# Patient Record
Sex: MALE | Race: WHITE | NOT HISPANIC OR LATINO | Employment: FULL TIME | URBAN - NONMETROPOLITAN AREA
[De-identification: names, ages, dates, MRNs, and addresses within clinical notes are randomized per-mention and may not be internally consistent; named-entity substitution may affect disease eponyms.]

---

## 2023-11-08 ENCOUNTER — OFFICE VISIT (OUTPATIENT)
Dept: PRIMARY CARE | Facility: CLINIC | Age: 38
End: 2023-11-08
Payer: COMMERCIAL

## 2023-11-08 VITALS
WEIGHT: 252.25 LBS | HEIGHT: 69 IN | SYSTOLIC BLOOD PRESSURE: 144 MMHG | HEART RATE: 66 BPM | BODY MASS INDEX: 37.36 KG/M2 | DIASTOLIC BLOOD PRESSURE: 90 MMHG

## 2023-11-08 DIAGNOSIS — R73.03 PREDIABETES: ICD-10-CM

## 2023-11-08 DIAGNOSIS — E66.01 CLASS 2 SEVERE OBESITY DUE TO EXCESS CALORIES WITH SERIOUS COMORBIDITY AND BODY MASS INDEX (BMI) OF 37.0 TO 37.9 IN ADULT (MULTI): ICD-10-CM

## 2023-11-08 DIAGNOSIS — Z09 HOSPITAL DISCHARGE FOLLOW-UP: Primary | ICD-10-CM

## 2023-11-08 DIAGNOSIS — E78.5 DYSLIPIDEMIA: ICD-10-CM

## 2023-11-08 DIAGNOSIS — I10 PRIMARY HYPERTENSION: ICD-10-CM

## 2023-11-08 PROBLEM — R07.9 CHEST PAIN: Status: ACTIVE | Noted: 2023-11-02

## 2023-11-08 PROCEDURE — 1036F TOBACCO NON-USER: CPT | Performed by: NURSE PRACTITIONER

## 2023-11-08 PROCEDURE — 3077F SYST BP >= 140 MM HG: CPT | Performed by: NURSE PRACTITIONER

## 2023-11-08 PROCEDURE — 99204 OFFICE O/P NEW MOD 45 MIN: CPT | Performed by: NURSE PRACTITIONER

## 2023-11-08 PROCEDURE — 3080F DIAST BP >= 90 MM HG: CPT | Performed by: NURSE PRACTITIONER

## 2023-11-08 PROCEDURE — 3008F BODY MASS INDEX DOCD: CPT | Performed by: NURSE PRACTITIONER

## 2023-11-08 RX ORDER — HYDROCHLOROTHIAZIDE 12.5 MG/1
12.5 TABLET ORAL DAILY
Qty: 30 TABLET | Refills: 0 | Status: SHIPPED | OUTPATIENT
Start: 2023-11-08 | End: 2023-11-29 | Stop reason: SDUPTHER

## 2023-11-08 NOTE — PROGRESS NOTES
Est Care with PCP   was seeing Dr. Bray   Last seen 2018  Follow up from ER Chest Pain     [facility]

## 2023-11-08 NOTE — PROGRESS NOTES
Subjective   Patient ID: Dagoberto Spangler is a 38 y.o. male who presents for Establish Care.    HPI   NPV     Baljinder here to establish care.  He was seen at [facility] ER on 11/2/23 for chest pain.     Chest pain: He reports that he started getting pain in his chest and felt like his chest was beating fast. He ended up going to the ER. They felt it may have been anxiety or GERD related as his cardiac testing was negative. He is concerned as he currently lives with his parents and takes care of his father who wanders in the evenings and has frequent falls d/t alcoholism. He reports he will startle awake sometimes but again does not know if this is related to issues from being hyperaware of listening for his father at night or something with his own health.    He did have a CTA in the ER. 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     His A1c was also found to be elevated in the ER which puts him in the prediabetic range. He reports prior to this event, he had been drinking Sprite and chocolate milk. He has stopped drinking these and is now drinking water. His lipid panel was also elevated. We discussed the causes for this d/t his diet.       Tobacco: Denies  ETOH: Denies  Recreational drugs: Denies      Review of Systems   Constitutional:  Negative for fatigue.   Respiratory:  Positive for chest tightness. Negative for shortness of breath.    Cardiovascular:  Negative for chest pain, palpitations and leg swelling.   Gastrointestinal:  Negative for abdominal pain, blood in stool, constipation, diarrhea, nausea and vomiting.   Genitourinary:  Negative for dysuria.   Musculoskeletal:  Negative for arthralgias and myalgias.   Skin:  Negative for color change.   Neurological:  Negative for dizziness, light-headedness and headaches.   Psychiatric/Behavioral:  Positive for sleep disturbance (d/t care of father). Negative for  "suicidal ideas. The patient is nervous/anxious.        Objective   /90   Pulse 66   Ht 1.753 m (5' 9\")   Wt 114 kg (252 lb 4 oz)   BMI 37.25 kg/m²     Physical Exam  Vitals and nursing note reviewed.   Constitutional:       Appearance: Normal appearance.   HENT:      Head: Normocephalic and atraumatic.   Cardiovascular:      Rate and Rhythm: Normal rate and regular rhythm.      Pulses: Normal pulses.      Heart sounds: Normal heart sounds.   Pulmonary:      Effort: Pulmonary effort is normal.      Breath sounds: Normal breath sounds.   Abdominal:      General: Bowel sounds are normal.      Palpations: Abdomen is soft.   Musculoskeletal:         General: Normal range of motion.   Skin:     General: Skin is warm and dry.   Neurological:      General: No focal deficit present.      Mental Status: He is alert and oriented to person, place, and time.   Psychiatric:         Mood and Affect: Mood normal.         Behavior: Behavior normal.         Thought Content: Thought content normal.         Judgment: Judgment normal.         Assessment/Plan   Problem List Items Addressed This Visit             ICD-10-CM    Primary hypertension I10     Will start hydrochlorothiazide 12.5 mg daily         Relevant Medications    hydroCHLOROthiazide (HYDRODiuril) 12.5 mg tablet    Other Relevant Orders    Basic metabolic panel    Prediabetes R73.03     11/2/23: A1c 6.1%         Relevant Orders    Hemoglobin A1C    Dyslipidemia E78.5     Discussed dietary changes. We will repeat labs in February. If still elevated, we will discuss medication.          Relevant Orders    Lipid panel    Class 2 severe obesity due to excess calories with serious comorbidity and body mass index (BMI) of 37.0 to 37.9 in adult (CMS/[facility]) E66.01, Z68.37     Other Visit Diagnoses         Codes    Hospital discharge follow-up    -  Primary Z09             Follow up in 2-3 weeks after starting hydrochlorothiazide. We will also repeat his labs in February. "

## 2023-11-10 PROBLEM — E66.01 CLASS 2 SEVERE OBESITY DUE TO EXCESS CALORIES WITH SERIOUS COMORBIDITY AND BODY MASS INDEX (BMI) OF 37.0 TO 37.9 IN ADULT (MULTI): Status: ACTIVE | Noted: 2023-11-10

## 2023-11-10 ASSESSMENT — ENCOUNTER SYMPTOMS
NAUSEA: 0
DYSURIA: 0
SHORTNESS OF BREATH: 0
SLEEP DISTURBANCE: 1
DIARRHEA: 0
CONSTIPATION: 0
LIGHT-HEADEDNESS: 0
DIZZINESS: 0
VOMITING: 0
PALPITATIONS: 0
CHEST TIGHTNESS: 1
ABDOMINAL PAIN: 0
COLOR CHANGE: 0
MYALGIAS: 0
ARTHRALGIAS: 0
NERVOUS/ANXIOUS: 1
HEADACHES: 0
BLOOD IN STOOL: 0
FATIGUE: 0

## 2023-11-11 NOTE — PATIENT INSTRUCTIONS
BMI was above normal measurement. Current weight:    Weight change since last visit (-) denotes wt loss     Weight loss needed to achieve BMI 25:   Lbs  Weight loss needed to achieve BMI 30:   Lbs  Provided instructions on dietary changes  Provided instructions on exercise  Advised to Increase physical activity

## 2023-11-11 NOTE — ASSESSMENT & PLAN NOTE
Discussed dietary changes. We will repeat labs in February. If still elevated, we will discuss medication.

## 2023-11-29 ENCOUNTER — OFFICE VISIT (OUTPATIENT)
Dept: PRIMARY CARE | Facility: CLINIC | Age: 38
End: 2023-11-29
Payer: COMMERCIAL

## 2023-11-29 VITALS
DIASTOLIC BLOOD PRESSURE: 80 MMHG | BODY MASS INDEX: 36.94 KG/M2 | WEIGHT: 249.44 LBS | HEIGHT: 69 IN | SYSTOLIC BLOOD PRESSURE: 136 MMHG | HEART RATE: 80 BPM

## 2023-11-29 DIAGNOSIS — R05.1 ACUTE COUGH: ICD-10-CM

## 2023-11-29 DIAGNOSIS — I10 PRIMARY HYPERTENSION: Primary | ICD-10-CM

## 2023-11-29 DIAGNOSIS — R06.2 WHEEZING: ICD-10-CM

## 2023-11-29 PROCEDURE — 99214 OFFICE O/P EST MOD 30 MIN: CPT | Performed by: NURSE PRACTITIONER

## 2023-11-29 PROCEDURE — 3079F DIAST BP 80-89 MM HG: CPT | Performed by: NURSE PRACTITIONER

## 2023-11-29 PROCEDURE — 1036F TOBACCO NON-USER: CPT | Performed by: NURSE PRACTITIONER

## 2023-11-29 PROCEDURE — 3008F BODY MASS INDEX DOCD: CPT | Performed by: NURSE PRACTITIONER

## 2023-11-29 PROCEDURE — 3075F SYST BP GE 130 - 139MM HG: CPT | Performed by: NURSE PRACTITIONER

## 2023-11-29 RX ORDER — BENZONATATE 100 MG/1
100 CAPSULE ORAL 3 TIMES DAILY PRN
Qty: 42 CAPSULE | Refills: 0 | Status: SHIPPED | OUTPATIENT
Start: 2023-11-29 | End: 2023-12-29

## 2023-11-29 RX ORDER — HYDROCHLOROTHIAZIDE 12.5 MG/1
12.5 TABLET ORAL DAILY
Qty: 90 TABLET | Refills: 1 | Status: SHIPPED | OUTPATIENT
Start: 2023-11-29 | End: 2024-02-28 | Stop reason: SDUPTHER

## 2023-11-29 RX ORDER — PREDNISONE 20 MG/1
40 TABLET ORAL DAILY
Qty: 10 TABLET | Refills: 0 | Status: SHIPPED | OUTPATIENT
Start: 2023-11-29 | End: 2024-02-28 | Stop reason: ALTCHOICE

## 2023-11-29 ASSESSMENT — ENCOUNTER SYMPTOMS
MUSCULOSKELETAL NEGATIVE: 1
WHEEZING: 0
SHORTNESS OF BREATH: 1
NEUROLOGICAL NEGATIVE: 1
GASTROINTESTINAL NEGATIVE: 1
PSYCHIATRIC NEGATIVE: 1
COUGH: 1
EYES NEGATIVE: 1
CARDIOVASCULAR NEGATIVE: 1
CHEST TIGHTNESS: 0
FEVER: 0
FATIGUE: 0

## 2023-11-29 NOTE — PROGRESS NOTES
"Subjective   Patient ID: Dagoberto Spangler is a 38 y.o. male who presents for Follow-up.    HPI   Baljinder returns for BP follow up.    HTN:  BP is better. He denies chest pain/tightness, palpitations. He reports he is feeling better, and has cut out pop and cut back on sweets.     He reports he developed a cough. He reports that the cough started shortly after starting medication. However he is on diuretic. He reports that the cough is dry with occasional clear sputum. He does get some shortness of breath with increased coughing. He has not taken anything OTC for symptoms.       Review of Systems   Constitutional:  Negative for fatigue and fever.   HENT:  Positive for postnasal drip. Negative for congestion.    Eyes: Negative.    Respiratory:  Positive for cough and shortness of breath. Negative for chest tightness and wheezing.    Cardiovascular: Negative.    Gastrointestinal: Negative.    Genitourinary: Negative.    Musculoskeletal: Negative.    Neurological: Negative.    Psychiatric/Behavioral: Negative.         Objective   /80   Pulse 80   Ht 1.753 m (5' 9\")   Wt 113 kg (249 lb 7 oz)   BMI 36.84 kg/m²     Physical Exam  Vitals and nursing note reviewed.   Constitutional:       Appearance: Normal appearance.   HENT:      Head: Normocephalic and atraumatic.      Nose: Congestion present.   Cardiovascular:      Rate and Rhythm: Normal rate and regular rhythm.      Pulses: Normal pulses.      Heart sounds: Normal heart sounds.   Pulmonary:      Effort: Pulmonary effort is normal. No respiratory distress.      Breath sounds: No stridor. Wheezing present. No rhonchi or rales.   Neurological:      General: No focal deficit present.      Mental Status: He is alert and oriented to person, place, and time.   Psychiatric:         Mood and Affect: Mood normal.         Behavior: Behavior normal.         Thought Content: Thought content normal.         Judgment: Judgment normal.         Assessment/Plan   Problem List " Items Addressed This Visit             ICD-10-CM    Primary hypertension - Primary I10     Continue hydrochlorothiazide 12.5 mg daily - refilled         Relevant Medications    hydroCHLOROthiazide (HYDRODiuril) 12.5 mg tablet     Other Visit Diagnoses         Codes    Acute cough     R05.1    Relevant Medications    benzonatate (Tessalon) 100 mg capsule    predniSONE (Deltasone) 20 mg tablet    Wheezing     R06.2    Relevant Medications    predniSONE (Deltasone) 20 mg tablet              Follow up in Feb, lab work prior to appointment. He will let us know if his cough turns more productive or symptoms worsen. We will start an ATB.

## 2024-02-21 ENCOUNTER — LAB (OUTPATIENT)
Dept: LAB | Facility: LAB | Age: 39
End: 2024-02-21
Payer: COMMERCIAL

## 2024-02-21 DIAGNOSIS — R73.03 PREDIABETES: ICD-10-CM

## 2024-02-21 DIAGNOSIS — E78.5 DYSLIPIDEMIA: ICD-10-CM

## 2024-02-21 DIAGNOSIS — I10 PRIMARY HYPERTENSION: ICD-10-CM

## 2024-02-21 DIAGNOSIS — E78.5 DYSLIPIDEMIA: Primary | ICD-10-CM

## 2024-02-21 LAB
ALBUMIN SERPL BCP-MCNC: 4.3 G/DL (ref 3.4–5)
ALP SERPL-CCNC: 64 U/L (ref 33–120)
ALT SERPL W P-5'-P-CCNC: 30 U/L (ref 10–52)
ANION GAP SERPL CALC-SCNC: 11 MMOL/L (ref 10–20)
AST SERPL W P-5'-P-CCNC: 21 U/L (ref 9–39)
BILIRUB DIRECT SERPL-MCNC: 0.1 MG/DL (ref 0–0.3)
BILIRUB SERPL-MCNC: 0.6 MG/DL (ref 0–1.2)
BUN SERPL-MCNC: 21 MG/DL (ref 6–23)
CALCIUM SERPL-MCNC: 9.4 MG/DL (ref 8.6–10.3)
CHLORIDE SERPL-SCNC: 102 MMOL/L (ref 98–107)
CHOLEST SERPL-MCNC: 242 MG/DL (ref 0–199)
CHOLESTEROL/HDL RATIO: 5.6
CO2 SERPL-SCNC: 28 MMOL/L (ref 21–32)
CREAT SERPL-MCNC: 0.99 MG/DL (ref 0.5–1.3)
EGFRCR SERPLBLD CKD-EPI 2021: >90 ML/MIN/1.73M*2
EST. AVERAGE GLUCOSE BLD GHB EST-MCNC: 126 MG/DL
GLUCOSE SERPL-MCNC: 118 MG/DL (ref 74–99)
HBA1C MFR BLD: 6 %
HDLC SERPL-MCNC: 43 MG/DL
LDLC SERPL CALC-MCNC: 169 MG/DL
NON HDL CHOLESTEROL: 199 MG/DL (ref 0–149)
POTASSIUM SERPL-SCNC: 4.2 MMOL/L (ref 3.5–5.3)
PROT SERPL-MCNC: 7.5 G/DL (ref 6.4–8.2)
SODIUM SERPL-SCNC: 137 MMOL/L (ref 136–145)
TRIGL SERPL-MCNC: 150 MG/DL (ref 0–149)
VLDL: 30 MG/DL (ref 0–40)

## 2024-02-21 PROCEDURE — 36415 COLL VENOUS BLD VENIPUNCTURE: CPT

## 2024-02-21 PROCEDURE — 80053 COMPREHEN METABOLIC PANEL: CPT

## 2024-02-21 PROCEDURE — 80061 LIPID PANEL: CPT

## 2024-02-21 PROCEDURE — 83036 HEMOGLOBIN GLYCOSYLATED A1C: CPT

## 2024-02-21 PROCEDURE — 82248 BILIRUBIN DIRECT: CPT

## 2024-02-28 ENCOUNTER — OFFICE VISIT (OUTPATIENT)
Dept: PRIMARY CARE | Facility: CLINIC | Age: 39
End: 2024-02-28
Payer: COMMERCIAL

## 2024-02-28 VITALS
WEIGHT: 253.38 LBS | DIASTOLIC BLOOD PRESSURE: 80 MMHG | BODY MASS INDEX: 37.53 KG/M2 | SYSTOLIC BLOOD PRESSURE: 124 MMHG | HEIGHT: 69 IN | HEART RATE: 96 BPM

## 2024-02-28 DIAGNOSIS — I10 PRIMARY HYPERTENSION: Primary | ICD-10-CM

## 2024-02-28 DIAGNOSIS — R73.03 PREDIABETES: ICD-10-CM

## 2024-02-28 DIAGNOSIS — E66.01 CLASS 2 SEVERE OBESITY DUE TO EXCESS CALORIES WITH SERIOUS COMORBIDITY AND BODY MASS INDEX (BMI) OF 37.0 TO 37.9 IN ADULT (MULTI): ICD-10-CM

## 2024-02-28 DIAGNOSIS — E78.5 DYSLIPIDEMIA: ICD-10-CM

## 2024-02-28 PROCEDURE — 1036F TOBACCO NON-USER: CPT | Performed by: NURSE PRACTITIONER

## 2024-02-28 PROCEDURE — 3074F SYST BP LT 130 MM HG: CPT | Performed by: NURSE PRACTITIONER

## 2024-02-28 PROCEDURE — 99214 OFFICE O/P EST MOD 30 MIN: CPT | Performed by: NURSE PRACTITIONER

## 2024-02-28 PROCEDURE — 3079F DIAST BP 80-89 MM HG: CPT | Performed by: NURSE PRACTITIONER

## 2024-02-28 PROCEDURE — 3008F BODY MASS INDEX DOCD: CPT | Performed by: NURSE PRACTITIONER

## 2024-02-28 RX ORDER — HYDROCHLOROTHIAZIDE 12.5 MG/1
12.5 TABLET ORAL DAILY
Qty: 90 TABLET | Refills: 1 | Status: SHIPPED | OUTPATIENT
Start: 2024-02-28 | End: 2024-05-31 | Stop reason: SDUPTHER

## 2024-02-28 ASSESSMENT — ENCOUNTER SYMPTOMS
VOMITING: 0
ABDOMINAL PAIN: 0
MYALGIAS: 0
CHEST TIGHTNESS: 0
COLOR CHANGE: 0
SHORTNESS OF BREATH: 0
HEMATOLOGIC/LYMPHATIC NEGATIVE: 1
NAUSEA: 0
LIGHT-HEADEDNESS: 0
HEADACHES: 0
BLOOD IN STOOL: 0
DIZZINESS: 0
CONSTIPATION: 0
PSYCHIATRIC NEGATIVE: 1
FATIGUE: 0
ARTHRALGIAS: 0
PALPITATIONS: 0
DIARRHEA: 0
DYSURIA: 0

## 2024-02-28 ASSESSMENT — PATIENT HEALTH QUESTIONNAIRE - PHQ9
2. FEELING DOWN, DEPRESSED OR HOPELESS: NOT AT ALL
1. LITTLE INTEREST OR PLEASURE IN DOING THINGS: NOT AT ALL
SUM OF ALL RESPONSES TO PHQ9 QUESTIONS 1 AND 2: 0

## 2024-02-28 NOTE — PROGRESS NOTES
"Subjective   Patient ID: Dagoberto Spangler is a 38 y.o. male who presents for Follow-up.    HPI   Baljinder returns for lab review, and BP follow up.    HTN: denies chest pain, dizziness or lower leg edema. He reports he is feeling better.    Prediabetes: has cut back on all \"pop\". Has an occasional one, with \"pizza\". Has stopped drinking chocolate milk. He is trying to eat healthier.     Hyperlipidemia: making small dietary adjustments at a time.       Review of Systems   Constitutional:  Negative for fatigue.   HENT: Negative.     Respiratory:  Negative for chest tightness and shortness of breath.    Cardiovascular:  Negative for chest pain, palpitations and leg swelling.   Gastrointestinal:  Negative for abdominal pain, blood in stool, constipation, diarrhea, nausea and vomiting.   Genitourinary:  Negative for dysuria.   Musculoskeletal:  Negative for arthralgias and myalgias.   Skin:  Negative for color change.   Neurological:  Negative for dizziness, light-headedness and headaches.   Hematological: Negative.    Psychiatric/Behavioral: Negative.         Objective   /80   Pulse 96   Ht 1.753 m (5' 9\")   Wt 115 kg (253 lb 6 oz)   BMI 37.42 kg/m²     Physical Exam  Vitals and nursing note reviewed.   Constitutional:       Appearance: Normal appearance.   Cardiovascular:      Rate and Rhythm: Normal rate and regular rhythm.      Pulses: Normal pulses.      Heart sounds: Normal heart sounds.   Pulmonary:      Effort: Pulmonary effort is normal.      Breath sounds: Normal breath sounds.   Skin:     General: Skin is warm and dry.   Neurological:      General: No focal deficit present.      Mental Status: He is alert and oriented to person, place, and time.   Psychiatric:         Mood and Affect: Mood normal.         Behavior: Behavior normal.         Thought Content: Thought content normal.         Judgment: Judgment normal.         Assessment/Plan   Problem List Items Addressed This Visit             ICD-10-CM    " Primary hypertension - Primary I10     Hydrochlorothiazide 12.5 mg daily         Relevant Medications    hydroCHLOROthiazide (Microzide) 12.5 mg tablet    Prediabetes R73.03     2/21/24: A1c 6.0%         Relevant Orders    Hemoglobin A1C    Dyslipidemia E78.5     2/21/24: chol 242, HDL 43, , Tri 150         Relevant Orders    Comprehensive Metabolic Panel    Lipid Panel    Class 2 severe obesity due to excess calories with serious comorbidity and body mass index (BMI) of 37.0 to 37.9 in adult (CMS/[facility]) E66.01, Z68.37     Pt advised to institute a healthy, well-balanced meal with portion control and to exercise daily for at least 30-60 minutes.              Follow up in 6 months for BP follow up. He will get labs before appt.

## 2024-05-31 DIAGNOSIS — I10 PRIMARY HYPERTENSION: ICD-10-CM

## 2024-05-31 RX ORDER — HYDROCHLOROTHIAZIDE 12.5 MG/1
12.5 TABLET ORAL DAILY
Qty: 90 TABLET | Refills: 0 | Status: SHIPPED | OUTPATIENT
Start: 2024-05-31

## 2024-08-27 ENCOUNTER — LAB (OUTPATIENT)
Dept: LAB | Facility: LAB | Age: 39
End: 2024-08-27
Payer: COMMERCIAL

## 2024-08-27 DIAGNOSIS — E78.5 DYSLIPIDEMIA: ICD-10-CM

## 2024-08-27 DIAGNOSIS — R73.03 PREDIABETES: ICD-10-CM

## 2024-08-27 LAB
ALBUMIN SERPL BCP-MCNC: 4.4 G/DL (ref 3.4–5)
ALP SERPL-CCNC: 59 U/L (ref 33–120)
ALT SERPL W P-5'-P-CCNC: 27 U/L (ref 10–52)
ANION GAP SERPL CALC-SCNC: 12 MMOL/L (ref 10–20)
AST SERPL W P-5'-P-CCNC: 21 U/L (ref 9–39)
BILIRUB SERPL-MCNC: 0.6 MG/DL (ref 0–1.2)
BUN SERPL-MCNC: 22 MG/DL (ref 6–23)
CALCIUM SERPL-MCNC: 9.6 MG/DL (ref 8.6–10.3)
CHLORIDE SERPL-SCNC: 103 MMOL/L (ref 98–107)
CHOLEST SERPL-MCNC: 235 MG/DL (ref 0–199)
CHOLESTEROL/HDL RATIO: 5.2
CO2 SERPL-SCNC: 29 MMOL/L (ref 21–32)
CREAT SERPL-MCNC: 0.99 MG/DL (ref 0.5–1.3)
EGFRCR SERPLBLD CKD-EPI 2021: >90 ML/MIN/1.73M*2
EST. AVERAGE GLUCOSE BLD GHB EST-MCNC: 120 MG/DL
GLUCOSE SERPL-MCNC: 106 MG/DL (ref 74–99)
HBA1C MFR BLD: 5.8 %
HDLC SERPL-MCNC: 45 MG/DL
LDLC SERPL CALC-MCNC: 164 MG/DL
NON HDL CHOLESTEROL: 190 MG/DL (ref 0–149)
POTASSIUM SERPL-SCNC: 4.5 MMOL/L (ref 3.5–5.3)
PROT SERPL-MCNC: 7.3 G/DL (ref 6.4–8.2)
SODIUM SERPL-SCNC: 139 MMOL/L (ref 136–145)
TRIGL SERPL-MCNC: 129 MG/DL (ref 0–149)
VLDL: 26 MG/DL (ref 0–40)

## 2024-08-27 PROCEDURE — 80061 LIPID PANEL: CPT

## 2024-08-27 PROCEDURE — 80053 COMPREHEN METABOLIC PANEL: CPT

## 2024-08-27 PROCEDURE — 36415 COLL VENOUS BLD VENIPUNCTURE: CPT

## 2024-08-27 PROCEDURE — 83036 HEMOGLOBIN GLYCOSYLATED A1C: CPT

## 2024-08-28 ENCOUNTER — APPOINTMENT (OUTPATIENT)
Dept: PRIMARY CARE | Facility: CLINIC | Age: 39
End: 2024-08-28
Payer: COMMERCIAL

## 2024-08-28 VITALS
HEART RATE: 78 BPM | HEIGHT: 69 IN | DIASTOLIC BLOOD PRESSURE: 78 MMHG | BODY MASS INDEX: 36.74 KG/M2 | SYSTOLIC BLOOD PRESSURE: 132 MMHG | WEIGHT: 248.06 LBS | RESPIRATION RATE: 18 BRPM

## 2024-08-28 DIAGNOSIS — F41.9 ANXIETY: ICD-10-CM

## 2024-08-28 DIAGNOSIS — E66.01 CLASS 2 SEVERE OBESITY DUE TO EXCESS CALORIES WITH SERIOUS COMORBIDITY AND BODY MASS INDEX (BMI) OF 36.0 TO 36.9 IN ADULT (MULTI): ICD-10-CM

## 2024-08-28 DIAGNOSIS — E78.5 DYSLIPIDEMIA: ICD-10-CM

## 2024-08-28 DIAGNOSIS — R73.03 PREDIABETES: ICD-10-CM

## 2024-08-28 DIAGNOSIS — I10 PRIMARY HYPERTENSION: Primary | ICD-10-CM

## 2024-08-28 PROCEDURE — 99214 OFFICE O/P EST MOD 30 MIN: CPT | Performed by: NURSE PRACTITIONER

## 2024-08-28 PROCEDURE — 3078F DIAST BP <80 MM HG: CPT | Performed by: NURSE PRACTITIONER

## 2024-08-28 PROCEDURE — 3075F SYST BP GE 130 - 139MM HG: CPT | Performed by: NURSE PRACTITIONER

## 2024-08-28 PROCEDURE — 3008F BODY MASS INDEX DOCD: CPT | Performed by: NURSE PRACTITIONER

## 2024-08-28 PROCEDURE — 1036F TOBACCO NON-USER: CPT | Performed by: NURSE PRACTITIONER

## 2024-08-28 RX ORDER — HYDROCHLOROTHIAZIDE 12.5 MG/1
12.5 TABLET ORAL DAILY
Qty: 90 TABLET | Refills: 1 | Status: SHIPPED | OUTPATIENT
Start: 2024-08-28

## 2024-08-28 RX ORDER — BUSPIRONE HYDROCHLORIDE 5 MG/1
5 TABLET ORAL 3 TIMES DAILY PRN
Qty: 90 TABLET | Refills: 0 | Status: SHIPPED | OUTPATIENT
Start: 2024-08-28

## 2024-08-28 ASSESSMENT — ENCOUNTER SYMPTOMS
COLOR CHANGE: 0
FATIGUE: 0
PALPITATIONS: 0
MYALGIAS: 0
SHORTNESS OF BREATH: 0
VOMITING: 0
NAUSEA: 0
DIZZINESS: 0
CHEST TIGHTNESS: 0
CONSTIPATION: 0
DIARRHEA: 0
BLOOD IN STOOL: 0
LIGHT-HEADEDNESS: 0
DYSURIA: 0
HEADACHES: 0
PSYCHIATRIC NEGATIVE: 1
ABDOMINAL PAIN: 0
ARTHRALGIAS: 0

## 2024-08-28 NOTE — PROGRESS NOTES
"Subjective   Patient ID: Dagoberto Spangler is a 38 y.o. male who presents for Follow-up.    HPI   Baljinder returns for HTN follow up and lab review.    HTN: denies chest pain/tightness, palpitations, dizziness, or lower leg edema. He reports that he will forget his medication occasional, but then will take 2 pills the next day to \"make up for it.\"     Prediabetes:  has improved from 6.0% to 5.8%. has continued not drinking soda. Has tried drinking sugar free and zero, but doesn't really like this so is trying to find alternative things to drink.     Hyperlipidemia: His Lipid panel has improved. chol 235, HDL 45, , Tri 129. He is continuing to watch what he is eating and drinking. Cutting back on red meats, dairy products, and regular soda.     Has noticed he has had an anxiety attack recently. Feels this happens at times when he has \"a lot going on or has a lot of things to accomplish in a day and feels like he \"can't get it all done.\"       Review of Systems   Constitutional:  Negative for fatigue.   Respiratory:  Negative for chest tightness and shortness of breath.    Cardiovascular:  Negative for chest pain, palpitations and leg swelling.   Gastrointestinal:  Negative for abdominal pain, blood in stool, constipation, diarrhea, nausea and vomiting.   Genitourinary:  Negative for dysuria.   Musculoskeletal:  Negative for arthralgias and myalgias.   Skin:  Negative for color change.   Neurological:  Negative for dizziness, light-headedness and headaches.   Psychiatric/Behavioral: Negative.         Objective   /78   Pulse 78   Resp 18   Ht 1.753 m (5' 9\")   Wt 113 kg (248 lb 1 oz)   BMI 36.63 kg/m²     Physical Exam  Vitals and nursing note reviewed.   Constitutional:       Appearance: Normal appearance.   HENT:      Head: Normocephalic and atraumatic.   Cardiovascular:      Rate and Rhythm: Normal rate and regular rhythm.      Pulses: Normal pulses.      Heart sounds: Normal heart sounds.   Pulmonary: "      Effort: Pulmonary effort is normal.      Breath sounds: Normal breath sounds.   Skin:     General: Skin is warm and dry.   Neurological:      General: No focal deficit present.      Mental Status: He is alert and oriented to person, place, and time.   Psychiatric:         Mood and Affect: Mood normal.         Behavior: Behavior normal.         Thought Content: Thought content normal.         Judgment: Judgment normal.         Assessment/Plan   Problem List Items Addressed This Visit             ICD-10-CM    Primary hypertension - Primary I10     Continue Hydrochlorothiazide 12.5 mg daily -refilled    Not quite at goal, continue to avoid excess sodium.          Relevant Medications    hydroCHLOROthiazide (Microzide) 12.5 mg tablet    Other Relevant Orders    CBC and Auto Differential    Prediabetes R73.03     2/21/24: A1c 6.0%  8/27/24: A1c down to 5.8%    Continue to watch diet, avoiding sugary drinks, simple carbs.         Relevant Orders    Comprehensive Metabolic Panel    Hemoglobin A1C    Dyslipidemia E78.5     2/21/24: chol 242, HDL 43, , Tri 150  8/27/234: Chol 235, HDL 45, LDL 1643, Tri 129 --improved    Continue to watch diet. Avoiding red meats, white potatoes, rice, bread.         Relevant Orders    Comprehensive Metabolic Panel    Lipid Panel    Class 2 severe obesity due to excess calories with serious comorbidity and body mass index (BMI) of 36.0 to 36.9 in adult (Multi) E66.01, Z68.36    Anxiety F41.9     Will trial Buspirone 5 mg TID as needed for anxiety/panic attacks.         Relevant Medications    busPIRone (Buspar) 5 mg tablet         Follow up in May with lab work prior to appointment.     For any new medications that were prescribed today, the patient was educated about their indications for use, administration, frequency and potential side effects of the medication.

## 2024-08-28 NOTE — PATIENT INSTRUCTIONS
No recent mental health visit.   Medications: start Buspirone 5 mg three times a day as needed for anxiety/panic attacks.  If taking medications, do not stop treatment without consulting healthcare provider.   If symptoms worsen or do not improve/stabilize, notify health care provider right away.   If thoughts of harming self or others notify health care provider immediately &/or seek urgent/emergent care including calling Suicide Hotline (448 or [phone]) or 871.

## 2024-08-28 NOTE — ASSESSMENT & PLAN NOTE
2/21/24: A1c 6.0%  8/27/24: A1c down to 5.8%    Continue to watch diet, avoiding sugary drinks, simple carbs.

## 2024-08-28 NOTE — ASSESSMENT & PLAN NOTE
Continue Hydrochlorothiazide 12.5 mg daily -refilled    Not quite at goal, continue to avoid excess sodium.

## 2024-08-28 NOTE — ASSESSMENT & PLAN NOTE
2/21/24: chol 242, HDL 43, , Tri 150  8/27/234: Chol 235, HDL 45, LDL 1643, Tri 129 --improved    Continue to watch diet. Avoiding red meats, white potatoes, rice, bread.

## 2025-05-28 ENCOUNTER — APPOINTMENT (OUTPATIENT)
Age: 40
End: 2025-05-28
Payer: COMMERCIAL

## 2025-05-28 VITALS
WEIGHT: 256.38 LBS | HEART RATE: 69 BPM | DIASTOLIC BLOOD PRESSURE: 80 MMHG | SYSTOLIC BLOOD PRESSURE: 118 MMHG | BODY MASS INDEX: 37.97 KG/M2 | HEIGHT: 69 IN

## 2025-05-28 DIAGNOSIS — R73.03 PREDIABETES: ICD-10-CM

## 2025-05-28 DIAGNOSIS — E78.5 DYSLIPIDEMIA: ICD-10-CM

## 2025-05-28 DIAGNOSIS — Z00.00 WELL ADULT EXAM: Primary | ICD-10-CM

## 2025-05-28 DIAGNOSIS — F41.9 ANXIETY: ICD-10-CM

## 2025-05-28 DIAGNOSIS — I10 PRIMARY HYPERTENSION: ICD-10-CM

## 2025-05-28 DIAGNOSIS — E66.812 CLASS 2 SEVERE OBESITY DUE TO EXCESS CALORIES WITH SERIOUS COMORBIDITY AND BODY MASS INDEX (BMI) OF 37.0 TO 37.9 IN ADULT: ICD-10-CM

## 2025-05-28 DIAGNOSIS — E66.01 CLASS 2 SEVERE OBESITY DUE TO EXCESS CALORIES WITH SERIOUS COMORBIDITY AND BODY MASS INDEX (BMI) OF 37.0 TO 37.9 IN ADULT: ICD-10-CM

## 2025-05-28 PROCEDURE — 1036F TOBACCO NON-USER: CPT | Performed by: NURSE PRACTITIONER

## 2025-05-28 PROCEDURE — 3074F SYST BP LT 130 MM HG: CPT | Performed by: NURSE PRACTITIONER

## 2025-05-28 PROCEDURE — 3079F DIAST BP 80-89 MM HG: CPT | Performed by: NURSE PRACTITIONER

## 2025-05-28 PROCEDURE — 99395 PREV VISIT EST AGE 18-39: CPT | Performed by: NURSE PRACTITIONER

## 2025-05-28 PROCEDURE — 3008F BODY MASS INDEX DOCD: CPT | Performed by: NURSE PRACTITIONER

## 2025-05-28 RX ORDER — HYDROCHLOROTHIAZIDE 12.5 MG/1
12.5 TABLET ORAL DAILY
Qty: 90 TABLET | Refills: 3 | Status: SHIPPED | OUTPATIENT
Start: 2025-05-28

## 2025-05-28 ASSESSMENT — ENCOUNTER SYMPTOMS
LIGHT-HEADEDNESS: 0
VOMITING: 0
DYSURIA: 0
FATIGUE: 0
CONSTIPATION: 0
CHEST TIGHTNESS: 0
DIZZINESS: 0
PALPITATIONS: 0
MYALGIAS: 0
BLOOD IN STOOL: 0
PSYCHIATRIC NEGATIVE: 1
DIARRHEA: 0
COLOR CHANGE: 0
SHORTNESS OF BREATH: 0
HEADACHES: 0
ARTHRALGIAS: 0
ABDOMINAL PAIN: 0
NAUSEA: 0

## 2025-05-28 ASSESSMENT — PATIENT HEALTH QUESTIONNAIRE - PHQ9
2. FEELING DOWN, DEPRESSED OR HOPELESS: NOT AT ALL
SUM OF ALL RESPONSES TO PHQ9 QUESTIONS 1 AND 2: 0
1. LITTLE INTEREST OR PLEASURE IN DOING THINGS: NOT AT ALL

## 2025-05-28 NOTE — PROGRESS NOTES
"Subjective   Patient ID: Dagoberto Spangler is a 39 y.o. male who presents for annual exam.    HPI   Baljinder returns for wellness exam, medication refill.    HTN: controlled. He denies chest pain/tightness, palpitations, dizziness, or headache.    Hyperlipidemia: he reports his diet has not been that good because he has been moving the last coupe months. He would like to wait on his lab work til he can get back on a better dietary course.    Anxiety: he has not had to use the buspar. He reports he has been able to calm himself down when he feels his anxiety is starting to increase.     Review of Systems   Constitutional:  Negative for fatigue.   HENT: Negative.     Respiratory:  Negative for chest tightness and shortness of breath.    Cardiovascular:  Negative for chest pain, palpitations and leg swelling.   Gastrointestinal:  Negative for abdominal pain, blood in stool, constipation, diarrhea, nausea and vomiting.   Genitourinary:  Negative for dysuria.   Musculoskeletal:  Negative for arthralgias and myalgias.   Skin:  Negative for color change.   Neurological:  Negative for dizziness, light-headedness and headaches.   Psychiatric/Behavioral: Negative.         Objective   /80   Pulse 69   Ht 1.753 m (5' 9\")   Wt 116 kg (256 lb 6 oz)   BMI 37.86 kg/m²     Physical Exam  Vitals and nursing note reviewed.   Constitutional:       Appearance: Normal appearance.   HENT:      Head: Normocephalic and atraumatic.   Cardiovascular:      Rate and Rhythm: Normal rate and regular rhythm.      Pulses: Normal pulses.      Heart sounds: Normal heart sounds.   Pulmonary:      Effort: Pulmonary effort is normal.      Breath sounds: Normal breath sounds.   Abdominal:      General: Bowel sounds are normal.   Skin:     General: Skin is warm and dry.   Neurological:      General: No focal deficit present.      Mental Status: He is alert and oriented to person, place, and time.   Psychiatric:         Mood and Affect: Mood normal.   "       Behavior: Behavior normal.         Thought Content: Thought content normal.         Judgment: Judgment normal.         Assessment/Plan   Problem List Items Addressed This Visit           ICD-10-CM    Primary hypertension I10    Continue Hydrochlorothiazide 12.5 mg daily -refilled    controlled          Relevant Medications    hydroCHLOROthiazide (Microzide) 12.5 mg tablet    Other Relevant Orders    CBC and Auto Differential    Prediabetes R73.03    2/21/24: A1c 6.0%  8/27/24: A1c down to 5.8%    Will check A1c in 3 months    Continue to watch diet, avoiding sugary drinks, simple carbs.         Relevant Orders    Comprehensive Metabolic Panel    Hemoglobin A1C    Dyslipidemia E78.5    2/21/24: chol 242, HDL 43, , Tri 150  8/27/24: Chol 235, HDL 45, LDL 1643, Tri 129 --improved  Not quite at goal  Will check lipid panel in 3 months    Continue to watch diet. Avoiding red meats, white potatoes, rice, bread.         Relevant Orders    Comprehensive Metabolic Panel    Lipid Panel    Class 2 severe obesity due to excess calories with serious comorbidity and body mass index (BMI) of 37.0 to 37.9 in adult E66.812, E66.01, Z68.37    Anxiety F41.9    Will trial Buspirone 5 mg TID as needed for anxiety/panic attacks.    He has not had to use.          Other Visit Diagnoses         Codes      Well adult exam    -  Primary Z00.00              Follow up in 1 year for wellness exam. We will contact him with results of his lab work. Return precautions discussed.     For any new medications that were prescribed today, the patient was educated about their indications for use, administration, frequency and potential side effects of the medication.

## 2025-05-28 NOTE — ASSESSMENT & PLAN NOTE
2/21/24: chol 242, HDL 43, , Tri 150  8/27/24: Chol 235, HDL 45, LDL 1643, Tri 129 --improved  Not quite at goal  Will check lipid panel in 3 months    Continue to watch diet. Avoiding red meats, white potatoes, rice, bread.

## 2025-05-28 NOTE — ASSESSMENT & PLAN NOTE
2/21/24: A1c 6.0%  8/27/24: A1c down to 5.8%    Will check A1c in 3 months    Continue to watch diet, avoiding sugary drinks, simple carbs.